# Patient Record
Sex: MALE | Race: BLACK OR AFRICAN AMERICAN
[De-identification: names, ages, dates, MRNs, and addresses within clinical notes are randomized per-mention and may not be internally consistent; named-entity substitution may affect disease eponyms.]

---

## 2019-07-23 ENCOUNTER — HOSPITAL ENCOUNTER (EMERGENCY)
Dept: HOSPITAL 74 - JERFT | Age: 29
Discharge: HOME | End: 2019-07-23
Payer: COMMERCIAL

## 2019-07-23 VITALS — TEMPERATURE: 98.5 F | SYSTOLIC BLOOD PRESSURE: 113 MMHG | DIASTOLIC BLOOD PRESSURE: 66 MMHG | HEART RATE: 80 BPM

## 2019-07-23 VITALS — BODY MASS INDEX: 29.5 KG/M2

## 2019-07-23 DIAGNOSIS — Z87.891: ICD-10-CM

## 2019-07-23 DIAGNOSIS — L03.012: Primary | ICD-10-CM

## 2019-07-23 PROCEDURE — 3E0233Z INTRODUCTION OF ANTI-INFLAMMATORY INTO MUSCLE, PERCUTANEOUS APPROACH: ICD-10-PCS | Performed by: STUDENT IN AN ORGANIZED HEALTH CARE EDUCATION/TRAINING PROGRAM

## 2019-07-23 PROCEDURE — 0J9K3ZZ DRAINAGE OF LEFT HAND SUBCUTANEOUS TISSUE AND FASCIA, PERCUTANEOUS APPROACH: ICD-10-PCS | Performed by: STUDENT IN AN ORGANIZED HEALTH CARE EDUCATION/TRAINING PROGRAM

## 2019-07-23 NOTE — PDOC
Rapid Medical Evaluation


Time Seen by Provider: 07/23/19 16:37


Medical Evaluation: 





07/23/19 16:37


HPI:L middle finger swelling x 3 days no fevers 





PE: L middle finger swelling 





ORDERS: Nothing 





**Discharge Disposition





- Diagnosis


 Paronychia








- Referrals





- Patient Instructions





- Post Discharge Activity

## 2019-07-23 NOTE — PDOC
History of Present Illness





- General


Chief Complaint: Pain


Stated Complaint: FINGER INJURY


Time Seen by Provider: 07/23/19 16:37


History Source: Patient


Exam Limitations: Clinical Condition





- History of Present Illness


Initial Comments: 





07/23/19 17:43


Patient with no significant past medical history present with complaint of three

-day history of swelling around nailbed of left middle finger with pain to 

whole middle finger status post injuring the finger week ago while changing a 

car tire . and reported increased pain around fingernail of left middle finger. 

Denies numbness or tingling sensation. Denies any other symptoms


Occurred: reports: last week





Past History





- Past Medical History


Allergies/Adverse Reactions: 


 Allergies











Allergy/AdvReac Type Severity Reaction Status Date / Time


 


No Known Allergies Allergy   Verified 07/23/19 16:38











Home Medications: 


Ambulatory Orders





Amox-Tr/K Cl [Augmentin - 875Mg Tablet] 1 tab PO BID #14 tablet 07/23/19 


Ibuprofen 800 mg PO Q8H PRN #20 tablet 07/23/19 











- Suicide/Smoking/Psychosocial Hx


Smoking History: Former smoker


Have you smoked in the past 12 months: Yes


If you are a former smoker, when did you quit?: 5


Information on smoking cessation initiated: Yes


Hx Alcohol Use: No


Drug/Substance Use Hx: No





**Review of Systems





- Review of Systems


Able to Perform ROS?: Yes


Is the patient limited English proficient: No


Constitutional: No: Malaise, Weakness


HEENTM: No: Symptoms Reported


Respiratory: No: Symptoms reported


Cardiac (ROS): No: Symptoms Reported


Musculoskeletal: Yes: Symptoms Reported, Joint Swelling (left middle finger), 

Muscle Pain (left middle finger)


Integumentary: Yes: Symptoms Reported, See HPI, Erythema (left middle finger 

around fingernail)


Neurological: No: Symptoms reported, Numbness, Paresthesia, Tingling


All Other Systems: Reviewed and Negative





*Physical Exam





- Vital Signs


 Last Vital Signs











Temp Pulse Resp BP Pulse Ox


 


 98.5 F   80   18   113/66   99 


 


 07/23/19 16:39  07/23/19 16:39  07/23/19 16:39  07/23/19 16:39  07/23/19 16:39














- Physical Exam


Comments: 





07/23/19 17:46


GENERAL:


Well developed, well nourished. Awake and alert in mild acute distress.





PULMONARY: 


No evidence of respiratory distress. 


MUSCULOSKELETAL : moderate tenderness to middle phalange and distal phalange 

with moderate swelling around nailbed of left middle finger


SKIN: 


Warm and dry. Normal capillary refill. Moderate swelling around nailbed of left 

middle finger.


NEUROLOGICAL: 


Alert, awake, appropriate.  No motor deficits in the  lower extremities.  Gait 

is normal without ataxia.


PSYCHIATRIC: 


Cooperative. Good eye contact. Appropriate mood and affect.


General Appearance: Yes: Nourished, Appropriately Dressed, Mild Distress





ED Treatment Course





- RADIOLOGY


Radiology Studies Ordered: 














 Category Date Time Status


 


 FINGER(S) LEFT [RAD] Stat Radiology  07/23/19 17:40 Ordered














Medical Decision Making





- Medical Decision Making





07/23/19 17:44


Patient with no significant past medical history present with complaint of three

-day history of swelling around nailbed of left middle finger with pain to 

whole middle finger status post injuring the finger week ago while changing a 

car tire . and reported increased pain around fingernail of left middle finger. 

Denies numbness or tingling sensation. Denies any other symptoms


Exam significant for moderate tenderness to middle phalange and distal phalange 

with moderate swelling around nailbed of left middle finger.


Symptoms likely paronychia from finger contusion versus less likely fractured 

finger. X-ray of left middle finger ordered to rule out acute pathology. Treat 

based on x-ray results


07/23/19 18:37


x-rays shows no acute fracture or dislocation. swelling of nailbed drained with 

18 guauge needle after given a digital block with 4cc 1% lidocaine. moderate 

amount of pururlent discharge from wound drained. wound culture obtained. 

Toradol 60mg IM ordered for pain. Patient stable for outpatient management of 

paronychia with Augmentin x 7 days and ibuprofen with advice to do warm 

compresses to finger with PCP follow-up





*DC/Admit/Observation/Transfer


Diagnosis at time of Disposition: 


 Paronychia








- Discharge Dispostion


Disposition: HOME


Condition at time of disposition: Stable


Decision to Admit order: No





- Prescriptions


Prescriptions: 


Amox-Tr/K Cl [Augmentin - 875Mg Tablet] 1 tab PO BID #14 tablet


Ibuprofen 800 mg PO Q8H PRN #20 tablet


 PRN Reason: finger pain





- Referrals





- Patient Instructions


Printed Discharge Instructions:  DI for Paronychia, DI for Wound Infection


Additional Instructions: 


Take medications as prescribed. Apply hot compress to finger 2-3 times a day 

for 5-10days as needed for swelling. Follow-up with your PCP in 3-5 days for 

reassessment





- Post Discharge Activity

## 2019-07-31 ENCOUNTER — HOSPITAL ENCOUNTER (EMERGENCY)
Dept: HOSPITAL 74 - JERFT | Age: 29
Discharge: HOME | End: 2019-07-31
Payer: COMMERCIAL

## 2019-07-31 VITALS — DIASTOLIC BLOOD PRESSURE: 70 MMHG | TEMPERATURE: 98.8 F | SYSTOLIC BLOOD PRESSURE: 111 MMHG | HEART RATE: 70 BPM

## 2019-07-31 VITALS — BODY MASS INDEX: 30.8 KG/M2

## 2019-07-31 DIAGNOSIS — Z48.01: Primary | ICD-10-CM

## 2019-07-31 NOTE — PDOC
Rapid Medical Evaluation


Medical Evaluation: 


 Allergies











Allergy/AdvReac Type Severity Reaction Status Date / Time


 


No Known Allergies Allergy   Verified 07/23/19 16:38











07/31/19 17:40





I have performed a brief in-person evaluation of this patient.





The patient presents with a chief complaint of: s/p I&D of L middle finger 

paronychia 7/23, on amox, here for wound check. States sxs are better





Pertinent physical exam findings:well healing wound on exam





I have ordered the following:nothing





The patient will proceed to the ED for further evaluation.


07/31/19 17:46








**Discharge Disposition





- Diagnosis


 Visit for wound check








- Referrals





- Patient Instructions





- Post Discharge Activity

## 2019-07-31 NOTE — PDOC
History of Present Illness





- General


Chief Complaint: Revisit,Wound Recheck


Stated Complaint: FINGER INJURY


Time Seen by Provider: 07/31/19 17:43





- History of Present Illness


Initial Comments: 





07/31/19 18:07


29-year-old male presents for evaluation of left middle finger wound check 

after an incision and drainage of paronychia about 5 days ago. He is on ABX and 

feelinf better. 


07/31/19 18:08








Past History





- Past Medical History


Allergies/Adverse Reactions: 


 Allergies











Allergy/AdvReac Type Severity Reaction Status Date / Time


 


No Known Allergies Allergy   Verified 07/23/19 16:38











Home Medications: 


Ambulatory Orders





NK [No Known Home Medication]  07/31/19 











- Suicide/Smoking/Psychosocial Hx


Smoking History: Never smoked


Have you smoked in the past 12 months: No


If you are a former smoker, when did you quit?: 5


Information on smoking cessation initiated: No


Hx Alcohol Use: No


Drug/Substance Use Hx: No





**Review of Systems





- Review of Systems


Integumentary: Yes: See HPI





*Physical Exam





- Vital Signs


 Last Vital Signs











Temp Pulse Resp BP Pulse Ox


 


 98.8 F   70   18   111/70   100 


 


 07/31/19 17:46  07/31/19 17:46  07/31/19 17:46  07/31/19 17:46  07/31/19 17:46














- Physical Exam


Comments: 





07/31/19 18:08


Left middle finger skin color and temperature are normal. There is mild 

swelling about the proximal radial and ulnar aspect of the nail fold no 

fluctuance warmth induration or erythema. There is minimal tenderness without 

gross sensory motor deficits.





Medical Decision Making





- Medical Decision Making





07/31/19 18:09


Patient to continue the antibiotics follow-up with hand surgery and return to 

work next week on Monday. Instructions given to return to the emergency room 

should there be worsening of symptoms prior to follow-up with hand surgery.





*DC/Admit/Observation/Transfer


Diagnosis at time of Disposition: 


 Visit for wound check, Paronychia








- Discharge Dispostion


Disposition: HOME


Condition at time of disposition: Stable


Decision to Admit order: No





- Referrals


Referrals: 


Joe Leon MD [Staff Physician] - 





- Patient Instructions


Additional Instructions: 


Return to the emergency room should there be any worsening of symptoms. Follow-

up with hand surgery in 1-2 days without fail for further evaluation and 

treatment options. Continue the antibiotics as directed.





- Post Discharge Activity


Forms/Work/School Notes:  Back to Work